# Patient Record
Sex: FEMALE | Race: WHITE | NOT HISPANIC OR LATINO | Employment: STUDENT | URBAN - METROPOLITAN AREA
[De-identification: names, ages, dates, MRNs, and addresses within clinical notes are randomized per-mention and may not be internally consistent; named-entity substitution may affect disease eponyms.]

---
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
## 2023-05-02 ENCOUNTER — OFFICE VISIT (OUTPATIENT)
Dept: OBSTETRICS AND GYNECOLOGY | Facility: CLINIC | Age: 18
End: 2023-05-02
Payer: COMMERCIAL

## 2023-05-02 VITALS
HEIGHT: 59 IN | SYSTOLIC BLOOD PRESSURE: 136 MMHG | DIASTOLIC BLOOD PRESSURE: 84 MMHG | BODY MASS INDEX: 35.36 KG/M2 | WEIGHT: 175.4 LBS

## 2023-05-02 DIAGNOSIS — Z30.45 ENCOUNTER FOR SURVEILLANCE OF TRANSDERMAL PATCH HORMONAL CONTRACEPTIVE DEVICE: Primary | ICD-10-CM

## 2023-05-02 DIAGNOSIS — Z78.9 USES BIRTH CONTROL: ICD-10-CM

## 2023-05-02 RX ORDER — EPINEPHRINE 0.3 MG/.3ML
0.3 INJECTION SUBCUTANEOUS
COMMUNITY
Start: 2022-12-29 | End: 2023-12-29

## 2023-05-02 RX ORDER — NORELGESTROMIN AND ETHINYL ESTRADIOL 150; 35 UG/D; UG/D
1 PATCH TRANSDERMAL WEEKLY
Qty: 12 PATCH | Refills: 4 | Status: SHIPPED | OUTPATIENT
Start: 2023-05-02

## 2023-05-02 RX ORDER — HYDROXYZINE HYDROCHLORIDE 10 MG/1
10-20 TABLET, FILM COATED ORAL
COMMUNITY
Start: 2023-04-19 | End: 2023-05-02

## 2023-05-02 RX ORDER — HYDROXYZINE HYDROCHLORIDE 10 MG/1
TABLET, FILM COATED ORAL
COMMUNITY
Start: 2023-04-19

## 2023-05-02 RX ORDER — CITALOPRAM 10 MG/1
1 TABLET ORAL DAILY
COMMUNITY
Start: 2023-01-23 | End: 2023-05-02

## 2023-05-02 NOTE — PROGRESS NOTES
"      Yudy Meyers is a 17 y.o. patient who presents for follow up of   Chief Complaint   Patient presents with   • Annual Exam     Birth control       HPI 17-year-old G0 here for contraception surveillance.  She uses the patch continuously.  Has had some breakthrough bleeding in the past and that continues over the last year.  Denies any of DVT or PE symptoms.  No change in medical history since last visit.    The following portions of the patient's history were reviewed and updated as appropriate: allergies, current medications and problem list.    Review of Systems   Constitutional: Negative for appetite change, fever and unexpected weight change.   HENT: Negative for congestion and sore throat.    Respiratory: Negative for cough and shortness of breath.    Cardiovascular: Negative for chest pain and palpitations.   Gastrointestinal: Negative for abdominal distention, abdominal pain, constipation, diarrhea, nausea and vomiting.   Endocrine: Negative.    Genitourinary: Negative for dyspareunia, menstrual problem, pelvic pain and vaginal discharge.   Skin: Negative.    Neurological: Negative for dizziness and syncope.   Hematological: Negative.    Psychiatric/Behavioral: Negative for dysphoric mood and sleep disturbance. The patient is not nervous/anxious.        /84   Ht 149.9 cm (59.02\")   Wt 79.6 kg (175 lb 6.4 oz)   LMP 04/28/2023 (Exact Date)   BMI 35.41 kg/m²     Physical Exam  Vitals and nursing note reviewed.   Constitutional:       Appearance: She is well-developed.   HENT:      Head: Normocephalic and atraumatic.   Pulmonary:      Effort: Pulmonary effort is normal. No respiratory distress.   Abdominal:      General: There is no distension.      Palpations: Abdomen is soft. There is no mass.      Tenderness: There is no abdominal tenderness. There is no guarding or rebound.   Musculoskeletal:         General: Normal range of motion.   Skin:     General: Skin is warm and dry.   Neurological:     "  Mental Status: She is alert and oriented to person, place, and time.   Psychiatric:         Behavior: Behavior normal.         Thought Content: Thought content normal.         Judgment: Judgment normal.           Assessment/Plan    Diagnoses and all orders for this visit:    1. Encounter for surveillance of transdermal patch hormonal contraceptive device (Primary)    2. Uses birth control  -     POC Urinalysis Dipstick  -     POC Pregnancy, Urine    Other orders  -     norelgestromin-ethinyl estradiol (Xulane) 150-35 MCG/24HR; Place 1 patch on the skin as directed by provider 1 (One) Time Per Week. No off weeks, use continuously.  Dispense: 12 patch; Refill: 4    Okay to refill birth control.  Follow-up 1 year.  Pap at 19.    Return in about 1 year (around 5/2/2024) for Annual physical.    I spent 20 minutes caring for Yudy on this date of service. This time includes time spent by me in the following activities: preparing for the visit, obtaining and/or reviewing a separately obtained history, performing a medically appropriate examination and/or evaluation, counseling and educating the patient/family/caregiver, ordering medications, tests, or procedures and documenting information in the medical record     Andi Rankin MD  5/2/2023  13:10 EDT

## 2024-07-12 ENCOUNTER — OFFICE VISIT (OUTPATIENT)
Dept: OBSTETRICS AND GYNECOLOGY | Facility: CLINIC | Age: 19
End: 2024-07-12
Payer: COMMERCIAL

## 2024-07-12 VITALS
HEIGHT: 59 IN | WEIGHT: 183 LBS | DIASTOLIC BLOOD PRESSURE: 94 MMHG | BODY MASS INDEX: 36.89 KG/M2 | SYSTOLIC BLOOD PRESSURE: 168 MMHG

## 2024-07-12 DIAGNOSIS — N91.4 SECONDARY OLIGOMENORRHEA: Primary | ICD-10-CM

## 2024-07-12 DIAGNOSIS — Z01.419 ROUTINE GYNECOLOGICAL EXAMINATION: ICD-10-CM

## 2024-07-12 LAB
B-HCG UR QL: NEGATIVE
BILIRUB BLD-MCNC: NEGATIVE MG/DL
CLARITY, POC: CLEAR
COLOR UR: YELLOW
EXPIRATION DATE: NORMAL
GLUCOSE UR STRIP-MCNC: NEGATIVE MG/DL
INTERNAL NEGATIVE CONTROL: NORMAL
INTERNAL POSITIVE CONTROL: NORMAL
KETONES UR QL: NEGATIVE
LEUKOCYTE EST, POC: NEGATIVE
Lab: NORMAL
NITRITE UR-MCNC: NEGATIVE MG/ML
PH UR: 5 [PH] (ref 5–8)
PROT UR STRIP-MCNC: NEGATIVE MG/DL
RBC # UR STRIP: NEGATIVE /UL
SP GR UR: 1 (ref 1–1.03)
UROBILINOGEN UR QL: NORMAL

## 2024-07-12 NOTE — PROGRESS NOTES
18-year-old G0 presents for follow-up.  Was on the patch until 1 year ago and thought her body needed a break.  Stopped patch.  Has been oligomenorrheic since.  Only 1 cycle in the last 11 months.  She was never regular after menarche.  Was regular on the patch.  Interested in restarting birth control but more of a long-term solution.  Discussed options of Nexplanon, IUD.  Noted hypertension but never been diagnosed previously.  Acne is present.  Increased BMI.  Suspect PCOS and insulin resistance.  Recommend follow-up for fasting PCOS labs.  Kyleena literature given.  If interested we can order it for her in place at her follow-up visit.  Patient agreeable with plan.    I spent 20 minutes caring for Yudy on this date of service. This time includes time spent by me in the following activities: preparing for the visit, reviewing tests, performing a medically appropriate examination and/or evaluation, counseling and educating the patient/family/caregiver, documenting information in the medical record, and ordering test(s).    Andi Rankin MD

## 2024-07-22 ENCOUNTER — TELEPHONE (OUTPATIENT)
Dept: OBSTETRICS AND GYNECOLOGY | Facility: CLINIC | Age: 19
End: 2024-07-22
Payer: COMMERCIAL

## 2024-07-31 ENCOUNTER — OFFICE VISIT (OUTPATIENT)
Dept: OBSTETRICS AND GYNECOLOGY | Facility: CLINIC | Age: 19
End: 2024-07-31
Payer: COMMERCIAL

## 2024-07-31 VITALS
WEIGHT: 188 LBS | BODY MASS INDEX: 37.9 KG/M2 | SYSTOLIC BLOOD PRESSURE: 160 MMHG | HEIGHT: 59 IN | DIASTOLIC BLOOD PRESSURE: 88 MMHG

## 2024-07-31 DIAGNOSIS — E28.2 PCOS (POLYCYSTIC OVARIAN SYNDROME): ICD-10-CM

## 2024-07-31 DIAGNOSIS — Z30.430 ENCOUNTER FOR IUD INSERTION: Primary | ICD-10-CM

## 2024-07-31 DIAGNOSIS — I10 HYPERTENSION, UNSPECIFIED TYPE: ICD-10-CM

## 2024-07-31 PROBLEM — E88.819 INSULIN RESISTANCE: Status: ACTIVE | Noted: 2024-07-31

## 2024-07-31 LAB
B-HCG UR QL: NEGATIVE
EXPIRATION DATE: NORMAL
INTERNAL NEGATIVE CONTROL: NEGATIVE
INTERNAL POSITIVE CONTROL: POSITIVE
Lab: 55

## 2024-07-31 NOTE — PROGRESS NOTES
18-year-old G0 here for follow-up.  Oligomenorrhea off birth control.  PCO S labs show insulin resistance.  Acne is present.  Increased BMI.  Hypertension confirmed with second reading now.  Blood pressure 160/88.  No headache or visual changes.  Discussed PCOS changes and to low-carb diet with intermittent fasting.  Referral placed for PCP for possible treatment of hypertension.    Patient desires Kyleena IUD.Procedure: Intrauterine device insertion    Pre procedure indication 1) Desires Kyleena  Post procedure indication 1) Desires Kyleena    The risks, benefits, and alternatives to an intrauterine device were explained at length with the patient. All her questions were answered and consents were signed.    The patient was placed in a dorsal lithotomy position on the examining table in [facility]. A bimanual exam confirmed the uterus was normal in size, anteverted. A warmed metal speculum was inserted into the vagina and the cervix was brought into view.    The cervix was prepped with Betadine. The anterior lip was grasped with a single-tooth tenaculum. The endometrial cavity was then sounded to 7 cm without use of a dilator. This sealed IUD package was opened and the IUD was removed in a sterile fashion.    The upper edge of the depth setting the flange was set at a uterine sound measured. The  was then carefully advanced to the cervical canal into the uterus to the level of the fundus. This was then backed off about 1.5-2 cm to allow sufficient space for the arms to open. The slider was then retracted about 1 cm and deployed the device. The device was then gently advanced to the fundus. The IUD was then released by pulling the slider down all the way. The  was removed carefully from the uterus. The threads were then cut leaving 2-3 cm visible outside of the cervix.  The single-tooth tenaculum was removed from the anterior lip. Good hemostasis was noted.     All other instruments were removed from  the vagina.   There were no complications.  The patient tolerated the procedure well with a minimal amount of discomfort.    The patient was counseled about the need to return in 4 weeks for string check.     She was counseled about the need to use a backup method of contraception such as condoms until her post insertion exam was performed. The patient verbalized understanding that the IUD will need to be removed after it expires. The patient has chosen the MMIUDSELECTION: Kyleena (5 year device).  The patient is counseled to contact us if she has any significant or increasing bleeding, pain, fever, chills, or other concerns. She is instructed to see a doctor right away if she believes that she may be pregnant at any time with the IUD in place.    HARIKA Rankin MD  7/31/2024  13:22 EDT

## 2024-08-22 ENCOUNTER — OFFICE VISIT (OUTPATIENT)
Dept: FAMILY MEDICINE CLINIC | Facility: CLINIC | Age: 19
End: 2024-08-22
Payer: COMMERCIAL

## 2024-08-22 ENCOUNTER — PATIENT ROUNDING (BHMG ONLY) (OUTPATIENT)
Dept: FAMILY MEDICINE CLINIC | Facility: CLINIC | Age: 19
End: 2024-08-22
Payer: COMMERCIAL

## 2024-08-22 VITALS
RESPIRATION RATE: 18 BRPM | HEIGHT: 59 IN | WEIGHT: 191 LBS | DIASTOLIC BLOOD PRESSURE: 86 MMHG | BODY MASS INDEX: 38.51 KG/M2 | OXYGEN SATURATION: 99 % | HEART RATE: 64 BPM | SYSTOLIC BLOOD PRESSURE: 132 MMHG

## 2024-08-22 DIAGNOSIS — F41.1 GENERALIZED ANXIETY DISORDER: Primary | ICD-10-CM

## 2024-08-22 DIAGNOSIS — E55.9 VITAMIN D DEFICIENCY: ICD-10-CM

## 2024-08-22 DIAGNOSIS — R53.83 FATIGUE, UNSPECIFIED TYPE: ICD-10-CM

## 2024-08-22 DIAGNOSIS — D64.9 ANEMIA, UNSPECIFIED TYPE: ICD-10-CM

## 2024-08-22 RX ORDER — VENLAFAXINE HYDROCHLORIDE 37.5 MG/1
37.5 TABLET, EXTENDED RELEASE ORAL
Qty: 30 EACH | Refills: 2 | Status: SHIPPED | OUTPATIENT
Start: 2024-08-22

## 2024-08-22 NOTE — PROGRESS NOTES
Adriano Fernandez,   [facility]  [address]  [place] [address]  [phone]    Subjective      Name Yudy Meyers MRN [id number]    2005 AGE/SEX 18 y.o. / female      Chief Complaint Chief Complaint   Patient presents with    Establish Care     New patient here to establish care, would like to discuss insulin resistance, high blood pressure and restarting anxiety medication          Visit History for  2024    Yudy Meyers is a 18 y.o. female who presented today for Establish Care (New patient here to establish care, would like to discuss insulin resistance, high blood pressure and restarting anxiety medication )       History of Present Illness  Presented today for evaluation of blood pressure and insulin resistance associated with her Polycystic Ovary Syndrome (PCOS). Her PCOS diagnosis was recent, following a 4-year period of birth control use. After discontinuing birth control last year, she experienced a year-long absence of menstruation, which has only recently resumed. She reports no symptoms of hypoglycemia. She also mentions feeling more fatigued than usual, despite taking naps. She is unsure if her thyroid function has been previously assessed. Her previous doctor had suggested checking her cortisol levels, but she does not recall the reason for this. She has never taken metformin.    She expresses a desire to resume anxiety medication. She has tried Celexa and Zoloft in the past, but found that Celexa induced anger and Zoloft left her feeling emotionally numb. She has also used hydroxyzine, which was effective but caused excessive sleepiness. She struggles with insomnia and experiences heightened anxiety before sleep, often fearing death if she falls asleep. She believes her current symptoms are a combination of anxiety and depression, with anxiety being the predominant issue. She has attended two therapy sessions and attempted journaling as a  "coping mechanism, but did not find it helpful. She is open to trying different therapies and medications.    She reports no seasonal allergies and has never felt the need to take allergy medication. However, she underwent allergy testing in 2022 due to persistent swelling of her upper lip, which eventually spread to one side of her face. An allergist suggested the swelling might be stress-related. The swelling now occurs infrequently and resolves on its own. She has never been tested for herpes and has never had cold sores.    SOCIAL HISTORY  She is single and has no children.    FAMILY HISTORY  Her grandmother had diabetes, but she does not know if it was type 1 or type 2. Her mother gets cold sores. Her mother had female reproductive issues and had a full hysterectomy 8 years ago. Her grandmother also had a partial hysterectomy when she was 21. She thinks her father's family has similar issues.             Medications and Allergies   Current Outpatient Medications   Medication Instructions    levonorgestrel (Kyleena) 19.5 MG intrauterine device IUD Intrauterine    venlafaxine 37.5 mg, Oral, Daily With Breakfast     No Known Allergies   I have reviewed the above medications and allergies     Objective:      Vitals Vitals:    08/22/24 0816   BP: 132/86   BP Location: Left arm   Patient Position: Sitting   Cuff Size: Adult   Pulse: 64   Resp: 18   SpO2: 99%   Weight: 86.6 kg (191 lb)   Height: 149.9 cm (59.02\")     Body mass index is 38.55 kg/m².    Physical Exam  Vitals reviewed.   Constitutional:       General: She is not in acute distress.     Appearance: She is not ill-appearing.   Cardiovascular:      Rate and Rhythm: Normal rate and regular rhythm.   Pulmonary:      Effort: Pulmonary effort is normal.      Breath sounds: Normal breath sounds.   Neurological:      Mental Status: She is alert.   Psychiatric:         Mood and Affect: Mood normal.         Behavior: Behavior normal.         Thought Content: Thought " content normal.         Judgment: Judgment normal.          Physical Exam       Results  Laboratory Studies  Fasting glucose levels were slightly elevated.   GAD7 DOCUMENTATION    Feeling nervous, anxious or on edge 3   Not being able to stop or control worrying 2   Worrying too much about different things 2   Trouble relaxing 2   Being so restless that it is hard to sit still 1   Becoming easily annoyed or irritable 0   Feeling Afraid as if something awful might happen 3   IRENE Total Score 13   If you checked any problems, how difficult have these problems made it for you to do your work, take care of things at home or get along with other people? Somewhat difficult      Assessment/Plan   Issues Addressed/ Plan   Diagnosis Plan   1. Generalized anxiety disorder  venlafaxine 37.5 MG tablet sustained-release 24 hour 24 hr tablet    TSH    T4, free      2. Fatigue, unspecified type  Comprehensive metabolic panel    CBC w AUTO Differential    Iron Profile    Vitamin D 25 hydroxy    Hemoglobin A1c    TSH    T4, free    Cortisol - AM      3. Anemia, unspecified type  CBC w AUTO Differential    Iron Profile      4. Vitamin D deficiency  Vitamin D 25 hydroxy         Assessment & Plan  1. Anxiety and depression.  Symptoms and self-reported experiences suggest a combination of anxiety and depression. Effexor has been prescribed. Potential side effects and the expected time frame for the medication to take effect have been discussed. She has been advised to report any side effects experienced. If Effexor proves unsatisfactory, a genetic test will be recommended to identify which medications are better metabolized by her body. Cognitive behavioral therapy or mindfulness and acceptance therapy have been suggested as potential therapeutic approaches. She has been encouraged to seek therapy and to request specific skills to manage anxiety during sessions.    2. Polycystic ovary syndrome.  Symptoms and medical history suggest PCOS.  Blood work has been ordered to monitor thyroid and blood sugar levels. The potential use of metformin for PCOS has been discussed.    3. Insulin resistance.  Blood work has been ordered to monitor blood sugar levels. The potential use of metformin has been discussed.    4. Facial swelling.  Recurrent facial swelling could be due to an atypical herpetic lesion, which may be stress-related. She has been advised to return for a consultation if the swelling recurs. At that time, an antiviral medication will be considered to determine if the issue is viral in nature.    Follow-up  A follow-up appointment is scheduled in 1 month to monitor the effectiveness of the new medication and to assess the progression of the PCOS.     Class 2 Severe Obesity (BMI >=35 and <=39.9). Obesity-related health conditions include the following: none. Obesity is unchanged. BMI is is above average; BMI management plan is completed. We discussed portion control and increasing exercise.     There are no Patient Instructions on file for this visit.   Follow up  recommended Return in about 1 month (around 9/22/2024) for PCOS, Anxiety.   - Dragon voice recognition software was utilized to complete this chart.  Every reasonable attempt was made to edit and correct the text, however some incorrect words may remain.   Adriano Fernandez DO    Patient or patient representative verbalized consent for the use of Ambient Listening during the visit with  Adriano Fernandez DO for chart documentation. 8/22/2024  09:16 EDT

## 2024-08-22 NOTE — PROGRESS NOTES
A [employer] message has been sent to the patient for PATIENT ROUNDING with [facility]

## 2024-08-22 NOTE — PROGRESS NOTES
Venipuncture Blood Specimen Collection  Venipuncture performed in right arm by Paris Quinn MA with good hemostasis. Patient tolerated the procedure well without complications.   08/22/24   Paris Quinn MA

## 2024-08-23 LAB
25(OH)D3+25(OH)D2 SERPL-MCNC: 17.8 NG/ML (ref 30–100)
ALBUMIN SERPL-MCNC: 4.3 G/DL (ref 3.5–5.2)
ALBUMIN/GLOB SERPL: 1.6 G/DL
ALP SERPL-CCNC: 142 U/L (ref 39–117)
ALT SERPL-CCNC: 9 U/L (ref 1–33)
AST SERPL-CCNC: 13 U/L (ref 1–32)
BASOPHILS # BLD AUTO: 0.04 10*3/MM3 (ref 0–0.2)
BASOPHILS NFR BLD AUTO: 0.5 % (ref 0–1.5)
BILIRUB SERPL-MCNC: 0.3 MG/DL (ref 0–1.2)
BUN SERPL-MCNC: 10 MG/DL (ref 6–20)
BUN/CREAT SERPL: 14.3 (ref 7–25)
CALCIUM SERPL-MCNC: 9.4 MG/DL (ref 8.6–10.5)
CHLORIDE SERPL-SCNC: 99 MMOL/L (ref 98–107)
CO2 SERPL-SCNC: 26.5 MMOL/L (ref 22–29)
CORTIS AM PEAK SERPL-MCNC: 15.3 UG/DL (ref 6.2–19.4)
CREAT SERPL-MCNC: 0.7 MG/DL (ref 0.57–1)
EGFRCR SERPLBLD CKD-EPI 2021: 127.2 ML/MIN/1.73
EOSINOPHIL # BLD AUTO: 0.11 10*3/MM3 (ref 0–0.4)
EOSINOPHIL NFR BLD AUTO: 1.3 % (ref 0.3–6.2)
ERYTHROCYTE [DISTWIDTH] IN BLOOD BY AUTOMATED COUNT: 13.4 % (ref 12.3–15.4)
GLOBULIN SER CALC-MCNC: 2.7 GM/DL
GLUCOSE SERPL-MCNC: 92 MG/DL (ref 65–99)
HBA1C MFR BLD: 4.8 % (ref 4.8–5.6)
HCT VFR BLD AUTO: 40.7 % (ref 34–46.6)
HGB BLD-MCNC: 13.3 G/DL (ref 12–15.9)
IMM GRANULOCYTES # BLD AUTO: 0.03 10*3/MM3 (ref 0–0.05)
IMM GRANULOCYTES NFR BLD AUTO: 0.3 % (ref 0–0.5)
IRON SATN MFR SERPL: 10 % (ref 20–50)
IRON SERPL-MCNC: 37 MCG/DL (ref 37–145)
LYMPHOCYTES # BLD AUTO: 1.78 10*3/MM3 (ref 0.7–3.1)
LYMPHOCYTES NFR BLD AUTO: 20.3 % (ref 19.6–45.3)
MCH RBC QN AUTO: 26 PG (ref 26.6–33)
MCHC RBC AUTO-ENTMCNC: 32.7 G/DL (ref 31.5–35.7)
MCV RBC AUTO: 79.6 FL (ref 79–97)
MONOCYTES # BLD AUTO: 0.48 10*3/MM3 (ref 0.1–0.9)
MONOCYTES NFR BLD AUTO: 5.5 % (ref 5–12)
NEUTROPHILS # BLD AUTO: 6.35 10*3/MM3 (ref 1.7–7)
NEUTROPHILS NFR BLD AUTO: 72.1 % (ref 42.7–76)
NRBC BLD AUTO-RTO: 0 /100 WBC (ref 0–0.2)
PLATELET # BLD AUTO: 377 10*3/MM3 (ref 140–450)
POTASSIUM SERPL-SCNC: 4.1 MMOL/L (ref 3.5–5.2)
PROT SERPL-MCNC: 7 G/DL (ref 6–8.5)
RBC # BLD AUTO: 5.11 10*6/MM3 (ref 3.77–5.28)
SODIUM SERPL-SCNC: 136 MMOL/L (ref 136–145)
T4 FREE SERPL-MCNC: 1.15 NG/DL (ref 0.92–1.68)
TIBC SERPL-MCNC: 359 MCG/DL
TSH SERPL DL<=0.005 MIU/L-ACNC: 2.6 UIU/ML (ref 0.27–4.2)
UIBC SERPL-MCNC: 322 MCG/DL (ref 112–346)
WBC # BLD AUTO: 8.79 10*3/MM3 (ref 3.4–10.8)

## 2024-08-28 ENCOUNTER — OFFICE VISIT (OUTPATIENT)
Dept: OBSTETRICS AND GYNECOLOGY | Facility: CLINIC | Age: 19
End: 2024-08-28
Payer: COMMERCIAL

## 2024-08-28 VITALS — SYSTOLIC BLOOD PRESSURE: 126 MMHG | DIASTOLIC BLOOD PRESSURE: 88 MMHG | HEIGHT: 59 IN | BODY MASS INDEX: 38.56 KG/M2

## 2024-08-28 DIAGNOSIS — Z30.431 IUD CHECK UP: Primary | ICD-10-CM

## 2024-08-28 LAB

## 2024-08-28 NOTE — PROGRESS NOTES
1 month status post Kyleena IUD placement.  Patient bled for 2 weeks but is stopped.  She has no perception of it being present.  Her significant other feels that during sex and she inquired about having her strings trimmed.  She has no other complaints and is happy with this form of birth control at this time.  Pelvic exam was performed.  Strings were somewhat longer than normal so they were trimmed back to about 2 cm.  Patient tolerated procedure well.  Follow-up 1 year annual physical.    I spent 10 minutes caring for Yudy on this date of service. This time includes time spent by me in the following activities: preparing for the visit, performing a medically appropriate examination and/or evaluation, counseling and educating the patient/family/caregiver, and documenting information in the medical record.    Andi Rankin MD

## 2024-09-01 RX ORDER — ERGOCALCIFEROL 1.25 MG/1
50000 CAPSULE, LIQUID FILLED ORAL WEEKLY
Qty: 5 CAPSULE | Refills: 2 | Status: SHIPPED | OUTPATIENT
Start: 2024-09-01

## 2024-09-27 DIAGNOSIS — F41.1 GENERALIZED ANXIETY DISORDER: ICD-10-CM

## 2024-09-27 RX ORDER — VENLAFAXINE HYDROCHLORIDE 37.5 MG/1
37.5 TABLET, EXTENDED RELEASE ORAL
Qty: 90 EACH | Refills: 0 | Status: SHIPPED | OUTPATIENT
Start: 2024-09-27

## 2024-11-19 ENCOUNTER — TELEMEDICINE (OUTPATIENT)
Dept: FAMILY MEDICINE CLINIC | Facility: CLINIC | Age: 19
End: 2024-11-19
Payer: COMMERCIAL

## 2024-11-19 DIAGNOSIS — F41.1 GENERALIZED ANXIETY DISORDER: ICD-10-CM

## 2024-11-19 DIAGNOSIS — E61.1 IRON DEFICIENCY: ICD-10-CM

## 2024-11-19 DIAGNOSIS — F51.01 PRIMARY INSOMNIA: Primary | ICD-10-CM

## 2024-11-19 DIAGNOSIS — E55.9 VITAMIN D DEFICIENCY: ICD-10-CM

## 2024-11-19 PROCEDURE — 99214 OFFICE O/P EST MOD 30 MIN: CPT | Performed by: STUDENT IN AN ORGANIZED HEALTH CARE EDUCATION/TRAINING PROGRAM

## 2024-11-19 RX ORDER — TRAZODONE HYDROCHLORIDE 50 MG/1
50 TABLET, FILM COATED ORAL NIGHTLY
Qty: 30 TABLET | Refills: 2 | Status: SHIPPED | OUTPATIENT
Start: 2024-11-19

## 2024-11-19 NOTE — PROGRESS NOTES
Adriano Fernandez,   [facility]  [address]  [place] [address]  [phone]    Subjective      Name Yudy Meyers MRN [id number]    2005 AGE/SEX 19 y.o. / female      Chief Complaint No chief complaint on file.        Visit History for  2024    Yudy Meyers is a 19 y.o. female who presented today for No chief complaint on file.       History of Present Illness  The patient is a 19-year-old female presenting after consenting to a video telemedicine visit.    At her previous appointment, she was prescribed Effexor 37.5 mg for anxiety and depression. She reports that the medication has been effective, although it occasionally causes forgetfulness.    She has recently relocated to [place] and started a new job. She has previously used hydroxyzine to manage her anxiety, which tends to worsen before bedtime. Hydroxyzine was effective in helping her sleep, but it resulted in prolonged sleep duration. Melatonin has not been beneficial for her. She has not used Benadryl extensively and has not tried Tylenol PM or Unisom.    She has been supplementing with iron and vitamin D.       Medications and Allergies   Current Outpatient Medications   Medication Instructions    levonorgestrel (Kyleena) 19.5 MG intrauterine device IUD Intrauterine    traZODone (DESYREL) 50 mg, Oral, Nightly    venlafaxine 37.5 mg, Oral, Daily With Breakfast    vitamin D (ERGOCALCIFEROL) 50,000 Units, Oral, Weekly     No Known Allergies   I have reviewed the above medications and allergies     Objective:      Vitals There were no vitals filed for this visit.  There is no height or weight on file to calculate BMI.    Physical Exam  Constitutional:       General: She is not in acute distress.     Appearance: She is not ill-appearing.   Pulmonary:      Effort: Pulmonary effort is normal.   Psychiatric:         Mood and Affect: Mood normal.         Behavior: Behavior normal.         Thought  Content: Thought content normal.         Judgment: Judgment normal.          Physical Exam       Results       Assessment/Plan   Issues Addressed/ Plan   Diagnosis Plan   1. Primary insomnia  traZODone (DESYREL) 50 MG tablet      2. Generalized anxiety disorder        3. Vitamin D deficiency        4. Iron deficiency           Assessment & Plan  1. Anxiety and depression.  The patient reports that Effexor 37.5 mg is working fine but causes some forgetfulness, a known side effect. A genetic test was discussed to determine if she is genetically predisposed to experiencing worse side effects. She requested something to help her sleep due to anxiety. Trazodone 50 mg was prescribed, with instructions to start at a half dose of 25 mg and increase to 50 mg if necessary. If 50 mg is ineffective, she can increase to 100 mg but should notify if she reaches this dose. Potential side effects and the cumulative nature of sleep were discussed.    2. Elevated insulin levels.  Her insulin levels are slightly elevated, but there is no indication of insulin resistance. She was advised to monitor her carbohydrate intake and practice carb counting to manage her insulin levels. She was also advised to keep a food journal to identify triggers for hypoglycemic episodes.    3. Iron deficiency.  Her iron levels are low, and her red blood cells are small, indicating a potential iron deficiency. She was advised to continue taking iron supplements.    4. Vitamin D deficiency.  Her vitamin D levels are low. She was advised to continue taking vitamin D supplements through the winter, potentially up to February, and to ensure adequate sun exposure during the summer.    Follow-up  Return in 6 months for follow up.           There are no Patient Instructions on file for this visit.   Follow up  recommended Return in about 6 months (around 5/19/2025).   - Dragon voice recognition software was utilized to complete this chart.  Every reasonable  attempt was made to edit and correct the text, however some incorrect words may remain.   Adriano Fernandez DO    Patient or patient representative verbalized consent for the use of Ambient Listening during the visit with  Adriano Fernandez DO for chart documentation. 11/19/2024  17:13 EST

## 2024-11-26 DIAGNOSIS — E55.9 VITAMIN D DEFICIENCY: ICD-10-CM

## 2024-11-26 RX ORDER — ERGOCALCIFEROL 1.25 MG/1
50000 CAPSULE, LIQUID FILLED ORAL WEEKLY
Qty: 5 CAPSULE | Refills: 2 | Status: SHIPPED | OUTPATIENT
Start: 2024-11-26

## 2024-12-09 ENCOUNTER — TELEPHONE (OUTPATIENT)
Dept: FAMILY MEDICINE CLINIC | Facility: CLINIC | Age: 19
End: 2024-12-09
Payer: COMMERCIAL

## 2024-12-09 DIAGNOSIS — F51.01 PRIMARY INSOMNIA: ICD-10-CM

## 2024-12-09 DIAGNOSIS — F41.1 GENERALIZED ANXIETY DISORDER: Primary | ICD-10-CM

## 2024-12-09 NOTE — TELEPHONE ENCOUNTER
Yudy [facility] (supporting Adriano Fernandez DO)2 days ago       Hi! So I've been taking the trazodone and I think it's making my anxiety worse. I've been feeling pretty bad overall and I've had 3 panic attacks in the last week. The medicine is the only new thing that's been added or changed lately. I'm going to stop taking it.

## 2024-12-13 RX ORDER — QUETIAPINE FUMARATE 25 MG/1
25 TABLET, FILM COATED ORAL NIGHTLY
Qty: 30 TABLET | Refills: 2 | Status: SHIPPED | OUTPATIENT
Start: 2024-12-13

## 2024-12-13 NOTE — TELEPHONE ENCOUNTER
Does she think that the venlafaxine is still working for her?  Another option for sleep would be Seroquil.  At low doses it is good for anxiety and sleep.  Should only need 25 mg.  She can stop the trazodone.  I will send the new medication if she would like to start

## 2024-12-18 DIAGNOSIS — F41.1 GENERALIZED ANXIETY DISORDER: ICD-10-CM

## 2024-12-18 DIAGNOSIS — F51.01 PRIMARY INSOMNIA: ICD-10-CM

## 2024-12-18 RX ORDER — QUETIAPINE FUMARATE 25 MG/1
25 TABLET, FILM COATED ORAL NIGHTLY
Qty: 90 TABLET | Refills: 0 | Status: SHIPPED | OUTPATIENT
Start: 2024-12-18

## 2025-01-17 ENCOUNTER — TELEMEDICINE (OUTPATIENT)
Dept: FAMILY MEDICINE CLINIC | Facility: CLINIC | Age: 20
End: 2025-01-17
Payer: COMMERCIAL

## 2025-01-17 DIAGNOSIS — F41.1 GENERALIZED ANXIETY DISORDER: ICD-10-CM

## 2025-01-17 DIAGNOSIS — G56.03 CARPAL TUNNEL SYNDROME, BILATERAL: Primary | ICD-10-CM

## 2025-01-17 DIAGNOSIS — L21.9 SEBORRHEIC DERMATITIS: ICD-10-CM

## 2025-01-17 DIAGNOSIS — M25.531 BILATERAL WRIST PAIN: ICD-10-CM

## 2025-01-17 DIAGNOSIS — M25.532 BILATERAL WRIST PAIN: ICD-10-CM

## 2025-01-17 PROCEDURE — 99214 OFFICE O/P EST MOD 30 MIN: CPT | Performed by: STUDENT IN AN ORGANIZED HEALTH CARE EDUCATION/TRAINING PROGRAM

## 2025-01-17 RX ORDER — KETOCONAZOLE 20 MG/ML
SHAMPOO, SUSPENSION TOPICAL 2 TIMES WEEKLY
Qty: 120 ML | Refills: 1 | Status: SHIPPED | OUTPATIENT
Start: 2025-01-20 | End: 2025-01-17

## 2025-01-17 RX ORDER — VENLAFAXINE HYDROCHLORIDE 75 MG/1
75 TABLET, EXTENDED RELEASE ORAL
Qty: 30 TABLET | Refills: 2 | Status: SHIPPED | OUTPATIENT
Start: 2025-01-17

## 2025-01-17 NOTE — PROGRESS NOTES
Adriano Fernandze,   [facility]  [address]  [place] [address]  [phone]    Subjective      Name Yudy Meyers MRN [id number]    2005 AGE/SEX 19 y.o. / female      Chief Complaint No chief complaint on file.        Visit History for  2025    Yudy Meyers is a 19 y.o. female who presented today for No chief complaint on file.       History of Present Illness  The patient presents via virtual visit for evaluation of bilateral wrist pain, sleep issues    She has been experiencing chronic bilateral wrist pain, which she attributes to a history of sprains in both wrists. The severity of the pain fluctuates, with some days being particularly debilitating. She reports numbness and tingling in her fingers, predominantly in the first three digits, but does not experience any exacerbation of these symptoms during wrist extension. Despite the pain, she is able to perform daily activities, although with limitations such as an inability to lift heavy objects. She also experiences sharp pain when attempting to twist or lift heavy objects. She has previously consulted a hand specialist who conducted an x-ray, but no further intervention was provided. She has not undergone a nerve conduction test. Additionally, she reports that her hands tend to fall asleep during the night. She has sought relief through various means including compression gloves, ibuprofen, and physical therapy, but none have provided significant improvement. She has a history of bilateral wrist sprains, with each wrist having been sprained twice. Her last x-ray was conducted 1 to 2 years ago, prior to her physical therapy sessions.    She has expressed interest in obtaining a diagnosis for autism. She has recently initiated therapy and has had a few sessions with her therapist, who suggested the possibility of ADHD. However, she believes that autism is a more likely diagnosis. She has completed the  RADS-R test and scored highly.    She has discontinued the initial medication prescribed for her sleep issues and has started a new one, which she reports is working well. However, she notes that the morning dose of venlafaxine does not seem to be as effective as it was previously. She is currently taking one pill at night and reports that her sleep is generally good, although there are some nights when she struggles even after taking the medication. She has been prescribed Seroquel and has stopped taking trazodone.         Medications and Allergies   Current Outpatient Medications   Medication Instructions    levonorgestrel (Kyleena) 19.5 MG intrauterine device IUD Intrauterine    QUEtiapine (SEROQUEL) 25 mg, Oral, Nightly    venlafaxine 75 mg, Oral, Daily With Breakfast    vitamin D (ERGOCALCIFEROL) 50,000 Units, Oral, Weekly     No Known Allergies   I have reviewed the above medications and allergies     Objective:      Vitals There were no vitals filed for this visit.  There is no height or weight on file to calculate BMI.    Physical Exam  Vitals reviewed.   Constitutional:       General: She is not in acute distress.     Appearance: She is not ill-appearing.   Pulmonary:      Effort: Pulmonary effort is normal.   Psychiatric:         Mood and Affect: Mood normal.         Behavior: Behavior normal.         Thought Content: Thought content normal.         Judgment: Judgment normal.          Physical Exam       Results  Imaging  X-ray of wrists showed no osseous articular or soft tissue abnormality.     Assessment/Plan   Issues Addressed/ Plan   Diagnosis Plan   1. Carpal tunnel syndrome, bilateral  EMG & Nerve Conduction Test      2. Generalized anxiety disorder  venlafaxine 75 MG tablet sustained-release 24 hour 24 hr tablet      3. Bilateral wrist pain  EMG & Nerve Conduction Test      4. Seborrheic dermatitis           Assessment & Plan  1. Bilateral wrist pain.  The etiology of the pain could potentially be  attributed to carpal tunnel syndrome or a connective tissue disorder. Previous x-rays have shown no osseous or soft tissue abnormalities. An EMG test will be ordered to rule out nerve involvement. She is advised to refrain from using braces until further notice. If the EMG results are normal, the focus will shift to musculoskeletal issues, and a plan involving specific exercises will be developed. If necessary, an MRI of the wrist may be considered, and a referral to a hand specialist will be made.    2. Suspected autism.  She has been informed about the availability of neuropsychologists and Replaced by [facility] for comprehensive psychological testing, which can provide a definitive diagnosis. The cost and duration of the testing were discussed.    3. Sleep issues.  The dosage of venlafaxine will be increased to 75 mg. She is advised to continue taking Seroquel at a dosage of 50 mg, with the option to increase to 1.5 tablets if necessary.           There are no Patient Instructions on file for this visit.   Follow up  recommended Return in about 3 months (around 4/17/2025).   - Dragon voice recognition software was utilized to complete this chart.  Every reasonable attempt was made to edit and correct the text, however some incorrect words may remain.   Adriano Fernandez DO    Patient or patient representative verbalized consent for the use of Ambient Listening during the visit with  Adriano Fernandez DO for chart documentation. 1/31/2025  22:32 EST

## 2025-02-25 DIAGNOSIS — E55.9 VITAMIN D DEFICIENCY: ICD-10-CM

## 2025-02-25 RX ORDER — ERGOCALCIFEROL 1.25 MG/1
50000 CAPSULE, LIQUID FILLED ORAL WEEKLY
Qty: 5 CAPSULE | Refills: 2 | Status: SHIPPED | OUTPATIENT
Start: 2025-02-25

## 2025-03-07 ENCOUNTER — HOSPITAL ENCOUNTER (OUTPATIENT)
Dept: NEUROLOGY | Facility: HOSPITAL | Age: 20
Discharge: HOME | End: 2025-03-07
Payer: COMMERCIAL

## 2025-03-07 DIAGNOSIS — M25.531 BILATERAL WRIST PAIN: ICD-10-CM

## 2025-03-07 DIAGNOSIS — G56.03 CARPAL TUNNEL SYNDROME, BILATERAL: ICD-10-CM

## 2025-03-07 DIAGNOSIS — M25.532 BILATERAL WRIST PAIN: ICD-10-CM

## 2025-03-07 PROCEDURE — 95886 MUSC TEST DONE W/N TEST COMP: CPT | Performed by: PSYCHIATRY & NEUROLOGY

## 2025-03-07 PROCEDURE — 95911 NRV CNDJ TEST 9-10 STUDIES: CPT | Performed by: PSYCHIATRY & NEUROLOGY

## 2025-03-07 NOTE — PROCEDURES
EMG and Nerve Conduction Studies    I.      Instrument used: Neuromax 1002  II.     Please see data sheets for tabular summary of NCS and details on methods, temperatures and lab standards.   III.    EMG muscles tested for upper extremity studies include the deltoid, biceps, triceps, pronator teres, extensor digitorum communis, first dorsal interosseous and abductor pollicis brevis.    IV.   EMG muscles tested for lower extremity studies include the vastus lateralis, tibialis anterior, peroneus longus, medial gastrocnemius and extensor digitorum brevis.    V.    Additional muscles tested as needed.  Paraspinal muscles tested as needed.   VI.   Please see data sheets for tabular summary of EMG findings.   VII. The complete report includes the data sheets.      Indication: Bilateral wrist pain and numbness in fingers  History: 19-year-old female with history of wrist sprains over the years due to several falls who describes persistent pain in the wrists and some numbness and tingling in fingers that she wakes up with.  Sometimes the pain radiates up to the elbows but this is rare.  She does not have specific pain in the elbows or shoulders.  She denies a history of diabetes or thyroid disease.      Ht: 59 inches  Wt: 180 pounds  HbA1C:   Lab Results   Component Value Date    HGBA1C 4.80 08/22/2024     TSH:   Lab Results   Component Value Date    TSH 2.600 08/22/2024       Technical summary:  Nerve conduction studies were obtained in both arms.  Skin temperatures were initially cold so the hands required warming prior to study.  Temperature correction was not needed.  Needle examination was obtained on selected muscles in both arms.    Results:  1.  Normal median orthodromic palmar sensory distal latencies and amplitudes bilaterally.  There was no relative prolongation of the median palmar versus ulnar palmar latencies.  2.  Normal ulnar orthodromic palmar sensory distal latencies and amplitudes bilaterally.  3.   Normal right radial sensory distal latency and amplitude.  4.  Normal median motor conduction velocities, distal latencies and amplitudes bilaterally.  5.  Normal ulnar motor conduction velocities with normal distal latencies and amplitudes bilaterally.  6.  Needle examination of selected muscles in both arms showed normal insertional activities throughout with normal motor units and recruitment patterns throughout.    Impression:  Normal study.  No evidence of a median or ulnar neuropathy or cervical radiculopathy on either side by this study.  Study results were discussed with the patient.    Bhupinder Meredith M.D.              Dictated utilizing Dragon dictation.

## 2025-03-09 DIAGNOSIS — F41.1 GENERALIZED ANXIETY DISORDER: ICD-10-CM

## 2025-03-09 DIAGNOSIS — F51.01 PRIMARY INSOMNIA: ICD-10-CM

## 2025-03-10 RX ORDER — QUETIAPINE FUMARATE 25 MG/1
25 TABLET, FILM COATED ORAL NIGHTLY
Qty: 90 TABLET | Refills: 0 | Status: SHIPPED | OUTPATIENT
Start: 2025-03-10

## 2025-03-30 DIAGNOSIS — F51.01 PRIMARY INSOMNIA: ICD-10-CM

## 2025-03-30 DIAGNOSIS — F41.1 GENERALIZED ANXIETY DISORDER: ICD-10-CM

## 2025-03-31 RX ORDER — QUETIAPINE FUMARATE 25 MG/1
25 TABLET, FILM COATED ORAL NIGHTLY
Qty: 90 TABLET | Refills: 0 | Status: SHIPPED | OUTPATIENT
Start: 2025-03-31

## 2025-04-16 DIAGNOSIS — F41.1 GENERALIZED ANXIETY DISORDER: ICD-10-CM

## 2025-04-16 RX ORDER — VENLAFAXINE HYDROCHLORIDE 75 MG/1
75 TABLET, EXTENDED RELEASE ORAL
Qty: 30 TABLET | Refills: 3 | Status: SHIPPED | OUTPATIENT
Start: 2025-04-16

## 2025-04-22 ENCOUNTER — OFFICE VISIT (OUTPATIENT)
Dept: FAMILY MEDICINE CLINIC | Facility: CLINIC | Age: 20
End: 2025-04-22
Payer: COMMERCIAL

## 2025-04-22 VITALS
HEART RATE: 81 BPM | WEIGHT: 211 LBS | HEIGHT: 59 IN | OXYGEN SATURATION: 96 % | DIASTOLIC BLOOD PRESSURE: 68 MMHG | BODY MASS INDEX: 42.54 KG/M2 | SYSTOLIC BLOOD PRESSURE: 102 MMHG

## 2025-04-22 DIAGNOSIS — L70.0 ACNE VULGARIS: ICD-10-CM

## 2025-04-22 DIAGNOSIS — E28.2 PCOS (POLYCYSTIC OVARIAN SYNDROME): ICD-10-CM

## 2025-04-22 DIAGNOSIS — L68.0 HIRSUTISM: Primary | ICD-10-CM

## 2025-04-22 DIAGNOSIS — M25.532 BILATERAL WRIST PAIN: ICD-10-CM

## 2025-04-22 DIAGNOSIS — M25.531 BILATERAL WRIST PAIN: ICD-10-CM

## 2025-04-22 PROCEDURE — 99214 OFFICE O/P EST MOD 30 MIN: CPT | Performed by: STUDENT IN AN ORGANIZED HEALTH CARE EDUCATION/TRAINING PROGRAM

## 2025-04-22 RX ORDER — SPIRONOLACTONE 25 MG/1
25 TABLET ORAL 2 TIMES DAILY
Qty: 60 TABLET | Refills: 2 | Status: SHIPPED | OUTPATIENT
Start: 2025-04-22

## 2025-05-11 NOTE — PROGRESS NOTES
Adriano Fernandez DO  [facility]  [address]  [place] [id number] [phone]    Subjective      Name Yudy Meyers MRN [id number]    2005 AGE/SEX 19 y.o. / female      Chief Complaint Chief Complaint   Patient presents with    Results     Follow up[ on results of nerve testing done on 3/7/25 and discuss other option for issues with wrist          Visit History for  2025    Yudy Meyers is a 19 y.o. female who presented today for Results (Follow up[ on results of nerve testing done on 3/7/25 and discuss other option for issues with wrist )       History of Present Illness  The patient presents for evaluation of bilateral wrist pain, acne, PCOS, and hypoglycemia.    Bilateral wrist pain has been experienced, attributed to multiple sprains. Pain frequency varies, occurring daily or every other day, influenced by weather conditions and activities. Lifting heavy objects, such as grocery bags, exacerbates the pain, while typing on a computer does not. Despite attempts to alleviate the pain through rest, over-the-counter analgesics, topical treatments like IcyHot, and compression gloves, no effective relief has been found. Frequent hand swelling is reported, necessitating the removal of her ring. A specialist previously conducted an x-ray that revealed no fractures. Pain is described as severe, particularly when lifting objects, with occasional sensations of something in the wrist about to snap. No correlation between hand swelling and wrist pain is noted. Increased pain in other joints, particularly the ankles and knees, has been noticed over the past few years. Ankle pain has become more pronounced in recent months, accompanied by frequent popping sounds. No history of ankle injuries is reported.    A diagnosis of polycystic ovary syndrome (PCOS) has been made, and an intrauterine device (IUD) has been used since 2024. Prior to this, a year-long break from  "birth control followed continuous use of the Xulane patch for 3 to 4 years. No significant changes in PCOS symptoms have been noted since starting the Kyleena IUD, although a slight improvement in acne is reported. Hot flashes are also experienced.    Painful acne, believed to be hormonal in nature, is reported. No effective topical treatments have been found, and current use includes Cetaphil face wash and Ponds unscented moisturizer. Salicylic acid or benzoyl peroxide face washes have not been tried.    Episodes of hypoglycemia, characterized by sudden drops in blood sugar levels, are experienced despite no changes to diet or lifestyle. Dietary modifications include increased protein intake and reduced consumption of soda and sugar. Sugar pills are carried at all times.    Venlafaxine is currently taken and believed to be effective. No recent depressive episodes are reported, and a stable mood is felt. Therapy sessions were previously attended but discontinued due to financial constraints.    FAMILY HISTORY  Her grandmother and mother had osteoarthritis. Her father mentioned that leukemia runs in the family, but did not specify who had it. Her grandmother had diabetes. There is a family history of Dupuytren's contracture.       Medications and Allergies   Current Outpatient Medications   Medication Instructions    levonorgestrel (Kyleena) 19.5 MG intrauterine device IUD Intrauterine    QUEtiapine (SEROQUEL) 25 mg, Oral, Nightly    spironolactone (ALDACTONE) 25 mg, Oral, 2 Times Daily    venlafaxine 75 mg, Oral, Daily With Breakfast    vitamin D (ERGOCALCIFEROL) 50,000 Units, Oral, Weekly     No Known Allergies   I have reviewed the above medications and allergies     Objective:      Vitals Vitals:    04/22/25 1554   BP: 102/68   BP Location: Left arm   Patient Position: Sitting   Cuff Size: Adult   Pulse: 81   SpO2: 96%   Weight: 95.7 kg (211 lb)   Height: 149.9 cm (59.02\")     Body mass index is 42.59 kg/m².  "   Physical Exam     Physical Exam  Neurological: Awake, alert, oriented x4, no focal deficit  Respiratory: Clear to auscultation, no wheezing, rales or rhonchi  Cardiovascular: Regular rate and rhythm, no murmurs, rubs, or gallops     Results  Labs   - A1c: Normal    Imaging   - X-ray of wrists: No fractures or arthritis    Diagnostic Testing   - Nerve testing: No median or ulnar nerve neuropathy or any issues with the neck     Assessment/Plan   Issues Addressed/ Plan   Diagnosis Plan   1. Hirsutism  spironolactone (Aldactone) 25 MG tablet      2. Bilateral wrist pain        3. Acne vulgaris        4. PCOS (polycystic ovarian syndrome)           Assessment & Plan  1. Bilateral wrist pain.  - The nerve conduction study results were normal, showing no median or ulnar nerve neuropathy or cervical spine issues.  - The patient has a history of frequent wrist sprains, which could lead to arthritis. Previous x-rays from two years ago showed no fractures or arthritis.  - The possibility of an autoimmune disorder was considered but deemed unlikely due to the absence of radiographic evidence. The bilateral nature of the wrist pain suggests osteoarthritis.  - The patient was advised to monitor for any correlation between finger swelling and wrist pain, as this could indicate a fluid retention issue. A repeat x-ray of the wrists will be ordered.    2. Acne.  - The patient was advised to use a salicylic acid face wash daily, followed by a light moisturizer.  - If cystic acne develops, benzoyl peroxide face wash should be used every other day.  - Spironolactone 25 mg twice daily was prescribed, with instructions to reduce the dosage to once daily if hypotension occurs.  - The patient was informed that initial use of salicylic acid may cause purging and an increase in acne for the first few weeks.    3. Polycystic Ovary Syndrome (PCOS).  - The patient was informed that joint pain can be hormonal and related to PCOS.  - The use of  spironolactone may help manage hormonal acne associated with PCOS.  - The patient was advised to monitor blood pressure while on spironolactone.  - The patient reported no significant change in PCOS symptoms since starting Kyleena IUD in 08/2024.    4. Hypoglycemia.  - The patient's A1c levels were within the normal range, so true hypoglycemia cannot be confirmed at this time.  - The patient was advised to carry sugar pills or juice for immediate sugar intake during hypoglycemic episodes.  - Blood work will be ordered during the next visit to check insulin levels and rule out any other potential causes of hypoglycemia.  - The patient has made dietary changes to include more protein and whole grains, and has reduced caffeine and sugar intake.    Follow-up  The patient will follow up in 2 months.           There are no Patient Instructions on file for this visit.   Follow up  recommended Return in about 2 months (around 6/22/2025).   - Dragon voice recognition software was utilized to complete this chart.  Every reasonable attempt was made to edit and correct the text, however some incorrect words may remain.   Adriano Fernandez DO    Patient or patient representative verbalized consent for the use of Ambient Listening during the visit with  Adriano Fernandez DO for chart documentation. 5/11/2025  12:19 EDT

## 2025-06-29 DIAGNOSIS — F41.1 GENERALIZED ANXIETY DISORDER: ICD-10-CM

## 2025-06-29 DIAGNOSIS — F51.01 PRIMARY INSOMNIA: ICD-10-CM

## 2025-06-30 RX ORDER — QUETIAPINE FUMARATE 25 MG/1
25 TABLET, FILM COATED ORAL NIGHTLY
Qty: 90 TABLET | Refills: 0 | Status: SHIPPED | OUTPATIENT
Start: 2025-06-30

## 2025-07-13 DIAGNOSIS — F41.1 GENERALIZED ANXIETY DISORDER: ICD-10-CM

## 2025-07-13 DIAGNOSIS — F51.01 PRIMARY INSOMNIA: ICD-10-CM

## 2025-07-14 RX ORDER — QUETIAPINE FUMARATE 25 MG/1
25 TABLET, FILM COATED ORAL NIGHTLY
Qty: 90 TABLET | Refills: 0 | Status: SHIPPED | OUTPATIENT
Start: 2025-07-14 | End: 2025-07-15 | Stop reason: SDUPTHER

## 2025-07-15 ENCOUNTER — OFFICE VISIT (OUTPATIENT)
Dept: FAMILY MEDICINE CLINIC | Facility: CLINIC | Age: 20
End: 2025-07-15
Payer: COMMERCIAL

## 2025-07-15 VITALS
OXYGEN SATURATION: 99 % | BODY MASS INDEX: 43.75 KG/M2 | SYSTOLIC BLOOD PRESSURE: 108 MMHG | HEIGHT: 59 IN | DIASTOLIC BLOOD PRESSURE: 74 MMHG | HEART RATE: 78 BPM | RESPIRATION RATE: 16 BRPM | WEIGHT: 217 LBS

## 2025-07-15 DIAGNOSIS — F51.01 PRIMARY INSOMNIA: ICD-10-CM

## 2025-07-15 DIAGNOSIS — R53.83 FATIGUE, UNSPECIFIED TYPE: ICD-10-CM

## 2025-07-15 DIAGNOSIS — E16.8 ELEVATED FASTING INSULIN LEVEL WITH NORMAL GLUCOSE: ICD-10-CM

## 2025-07-15 DIAGNOSIS — E78.5 HYPERLIPIDEMIA, UNSPECIFIED HYPERLIPIDEMIA TYPE: ICD-10-CM

## 2025-07-15 DIAGNOSIS — E61.1 IRON DEFICIENCY: ICD-10-CM

## 2025-07-15 DIAGNOSIS — M25.60 MORNING STIFFNESS OF JOINTS: ICD-10-CM

## 2025-07-15 DIAGNOSIS — H04.129 DRY EYE: ICD-10-CM

## 2025-07-15 DIAGNOSIS — E66.813 CLASS 3 SEVERE OBESITY DUE TO EXCESS CALORIES WITHOUT SERIOUS COMORBIDITY WITH BODY MASS INDEX (BMI) OF 40.0 TO 44.9 IN ADULT: ICD-10-CM

## 2025-07-15 DIAGNOSIS — E04.2 MULTINODULAR THYROID: ICD-10-CM

## 2025-07-15 DIAGNOSIS — E55.9 VITAMIN D DEFICIENCY: ICD-10-CM

## 2025-07-15 DIAGNOSIS — M25.50 MULTIPLE JOINT PAIN: ICD-10-CM

## 2025-07-15 DIAGNOSIS — F41.1 GENERALIZED ANXIETY DISORDER: Primary | ICD-10-CM

## 2025-07-15 RX ORDER — QUETIAPINE FUMARATE 50 MG/1
50 TABLET, FILM COATED ORAL NIGHTLY
Qty: 30 TABLET | Refills: 2 | Status: SHIPPED | OUTPATIENT
Start: 2025-07-15

## 2025-07-15 NOTE — PROGRESS NOTES
Venipuncture Blood Specimen Collection  Venipuncture performed in right arm by Paris Quinn MA with good hemostasis. Patient tolerated the procedure well without complications.   07/15/25   Paris Quinn MA

## 2025-07-15 NOTE — PROGRESS NOTES
Adriano Fernandez,   [facility]  [facility]  [place] [id number] [phone]    Subjective      Name Yudy Meyers MRN [id number]    2005 AGE/SEX 19 y.o. / female      Chief Complaint Chief Complaint   Patient presents with    Med Management     Check up and labs          Visit History for  07/15/2025    Yudy Meyers is a 19 y.o. female who presented today for Med Management (Check up and labs )       History of Present Illness  The patient presents for evaluation of sleep issues, joint pain, PCOS, and vitamin D deficiency.    She reports that her sleep medication is not consistently effective. On some nights, it works well, particularly when she has not taken it for a few days. However, on other nights, it seems to have no effect. She takes the medication daily to prevent frequent awakenings at night. Initially, the medication was highly effective.    She has discontinued her vitamin D supplement as per the previous advice to stop during the summer.    She has been diagnosed with insulin resistance by Dr. Lopez. She experiences dry mouth and severe dry eyes.    She has not yet undergone the recommended wrist x-rays due to time constraints. Her wrist condition remains unchanged. Recently, she has begun to experience significant pain in her ankles and knees. Upon waking, her ankles are sore, but she can alleviate this temporarily by popping them. Prolonged walking exacerbates the pain. Her pain levels vary depending on her activities throughout the day. 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 She also experiences a pinching sensation in her lower back when standing for extended periods, such as while washing dishes. She reports occasional radiating leg pain, numbness, and tingling. She has been attempting to incorporate more stretching into her routine.    She is  "currently using birth control without any complications.       Medications and Allergies   Current Outpatient Medications   Medication Instructions    levonorgestrel (Kyleena) 19.5 MG intrauterine device IUD Intrauterine    QUEtiapine (SEROQUEL) 50 mg, Oral, Nightly    spironolactone (ALDACTONE) 25 mg, Oral, 2 Times Daily    venlafaxine 75 mg, Oral, Daily With Breakfast    vitamin D (ERGOCALCIFEROL) 50,000 Units, Oral, Weekly     No Known Allergies   I have reviewed the above medications and allergies     Objective:      Vitals Vitals:    07/15/25 0843   BP: 108/74   BP Location: Left arm   Patient Position: Sitting   Cuff Size: Adult   Pulse: 78   Resp: 16   SpO2: 99%   Weight: 98.4 kg (217 lb)   Height: 149.9 cm (59.02\")     Body mass index is 43.8 kg/m².    Physical Exam  Vitals reviewed.   Constitutional:       General: She is not in acute distress.     Appearance: She is not ill-appearing.   Pulmonary:      Effort: Pulmonary effort is normal.   Psychiatric:         Mood and Affect: Mood normal.         Behavior: Behavior normal.         Thought Content: Thought content normal.         Judgment: Judgment normal.          Physical Exam       Results  Labs   - Insulin Levels: Increased     Assessment/Plan   Issues Addressed/ Plan   Diagnosis Plan   1. Generalized anxiety disorder  QUEtiapine (SEROquel) 50 MG tablet      2. Primary insomnia  QUEtiapine (SEROquel) 50 MG tablet      3. Dry eye  Lyme Disease Total Antibody With Reflex to Immunoassay    Cyclic Citrul Peptide Antibody, IgG / IgA    CK    C-reactive Protein    Anti-Judy 1 Antibody, IgG    Sjogren's Antibody, Anti-SS-A / -SS-B    Anti-Smith Antibody    Anti-DNA Antibody, Double-stranded    MONTEZ by IFA, Reflex to Titer and Pattern      4. Multinodular thyroid        5. Multiple joint pain  Lyme Disease Total Antibody With Reflex to Immunoassay    Cyclic Citrul Peptide Antibody, IgG / IgA    CK    C-reactive Protein    Sjogren's Antibody, Anti-SS-A / -SS-B    " Thyroid Antibodies    T4, Free    T3, Free    TSH    Anti-Smith Antibody    Anti-DNA Antibody, Double-stranded    MONTEZ by IFA, Reflex to Titer and Pattern    T3, Reverse      6. Morning stiffness of joints  Lyme Disease Total Antibody With Reflex to Immunoassay    Cyclic Citrul Peptide Antibody, IgG / IgA    CK    C-reactive Protein    Sjogren's Antibody, Anti-SS-A / -SS-B    Thyroid Antibodies    T4, Free    T3, Free    TSH    Anti-Smith Antibody    Anti-DNA Antibody, Double-stranded    MONTEZ by IFA, Reflex to Titer and Pattern    T3, Reverse      7. Iron deficiency  Iron Profile w/o Ferritin      8. Vitamin D deficiency  Vitamin D 25 hydroxy      9. Elevated fasting insulin level with normal glucose  Hemoglobin A1c    Comprehensive Metabolic Panel    T4, Free    T3, Free    TSH    Insulin Antibody      10. Fatigue, unspecified type  Folate    Vitamin B12    CBC w AUTO Differential      11. Hyperlipidemia, unspecified hyperlipidemia type  Lipid Panel      12. Class 3 severe obesity due to excess calories without serious comorbidity with body mass index (BMI) of 40.0 to 44.9 in adult  CBC w AUTO Differential         Assessment & Plan  1. Sleep issues.  - Reports inconsistent effectiveness of her current sleep medication.  - Dosage will be increased to 50 mg.  - Advised that she can take a night off from the medication if needed to avoid dependency.  - Initially, the medication was effective every night.    2. Vitamin D deficiency.  - Stopped taking vitamin D as previously instructed for the summer.  - A recheck of her vitamin D levels will be conducted today.  - Labs have always been good.  - Blood work will be done today.    3. Joint pain.  - Experiences pain in her ankles and knees, particularly in the morning and after physical activity.  - Pain in her lower back described as a pinching sensation during prolonged activities.  - Comprehensive set of labs will be ordered to investigate potential causes, including  thyroid function, autoimmune antibodies, B12, folate, blood sugar, liver function, and kidney function.  - Previous tests for autoantibodies were negative; rechecking with more specific tests.    4. Polycystic ovary syndrome (PCOS).  - Increased insulin levels but does not exhibit insulin resistance.  - Test for type 2 diabetes will be conducted.  - Birth control is well-tolerated with no issues reported.  - Checking for other deficiencies related to PCOS symptoms.    5. Health maintenance.  - Cortisol levels previously checked and found to be within normal range.  - Due for a physical examination.  - Annual physical examination will be scheduled for 10/2025.  - Follow-up in 3 months for the physical examination.           There are no Patient Instructions on file for this visit.   Follow up  recommended Return in about 3 months (around 10/15/2025) for Annual physical.   - Dragon voice recognition software was utilized to complete this chart.  Every reasonable attempt was made to edit and correct the text, however some incorrect words may remain.   Adriano Fernandez DO    Patient or patient representative verbalized consent for the use of Ambient Listening during the visit with  Adriano Fernandez DO for chart documentation. 7/30/2025  15:52 EDT

## 2025-07-16 LAB — B BURGDOR IGG+IGM SER QL IA: NEGATIVE

## 2025-07-19 DIAGNOSIS — L68.0 HIRSUTISM: ICD-10-CM

## 2025-07-21 RX ORDER — SPIRONOLACTONE 25 MG/1
25 TABLET ORAL 2 TIMES DAILY
Qty: 60 TABLET | Refills: 3 | Status: SHIPPED | OUTPATIENT
Start: 2025-07-21

## 2025-07-23 LAB
25(OH)D3+25(OH)D2 SERPL-MCNC: 26.7 NG/ML (ref 30–100)
ALBUMIN SERPL-MCNC: 4 G/DL (ref 4–5)
ALP SERPL-CCNC: 129 IU/L (ref 44–121)
ALT SERPL-CCNC: 24 IU/L (ref 0–32)
ANA SER QL IF: NEGATIVE
AST SERPL-CCNC: 24 IU/L (ref 0–40)
BASOPHILS # BLD AUTO: 0 X10E3/UL (ref 0–0.2)
BASOPHILS NFR BLD AUTO: 0 %
BILIRUB SERPL-MCNC: 0.2 MG/DL (ref 0–1.2)
BUN SERPL-MCNC: 9 MG/DL (ref 6–20)
BUN/CREAT SERPL: 12 (ref 9–23)
CALCIUM SERPL-MCNC: 9.5 MG/DL (ref 8.7–10.2)
CCP IGA+IGG SERPL IA-ACNC: 5 UNITS (ref 0–19)
CHLORIDE SERPL-SCNC: 102 MMOL/L (ref 96–106)
CHOLEST SERPL-MCNC: 155 MG/DL (ref 100–199)
CK SERPL-CCNC: 46 U/L (ref 32–182)
CO2 SERPL-SCNC: 20 MMOL/L (ref 20–29)
CREAT SERPL-MCNC: 0.77 MG/DL (ref 0.57–1)
CRP SERPL-MCNC: 19 MG/L (ref 0–10)
DSDNA AB SER-ACNC: <1 IU/ML (ref 0–9)
EGFRCR SERPLBLD CKD-EPI 2021: 112 ML/MIN/1.73
ENA JO1 AB SER-ACNC: <0.2 AI (ref 0–0.9)
ENA SM AB SER-ACNC: <0.2 AI (ref 0–0.9)
ENA SS-A AB SER-ACNC: <0.2 AI (ref 0–0.9)
ENA SS-B AB SER-ACNC: <0.2 AI (ref 0–0.9)
EOSINOPHIL # BLD AUTO: 0.1 X10E3/UL (ref 0–0.4)
EOSINOPHIL NFR BLD AUTO: 2 %
ERYTHROCYTE [DISTWIDTH] IN BLOOD BY AUTOMATED COUNT: 13.1 % (ref 11.7–15.4)
FOLATE SERPL-MCNC: 10.8 NG/ML
GLOBULIN SER CALC-MCNC: 3.1 G/DL (ref 1.5–4.5)
GLUCOSE SERPL-MCNC: 85 MG/DL (ref 70–99)
HBA1C MFR BLD: 4.9 % (ref 4.8–5.6)
HCT VFR BLD AUTO: 43.1 % (ref 34–46.6)
HDLC SERPL-MCNC: 31 MG/DL
HGB BLD-MCNC: 13.4 G/DL (ref 11.1–15.9)
IMM GRANULOCYTES # BLD AUTO: 0 X10E3/UL (ref 0–0.1)
IMM GRANULOCYTES NFR BLD AUTO: 0 %
INSULIN AB SER-ACNC: <5 UU/ML
IRON SATN MFR SERPL: 17 % (ref 15–55)
IRON SERPL-MCNC: 53 UG/DL (ref 27–159)
LDLC SERPL CALC-MCNC: 98 MG/DL (ref 0–99)
LYMPHOCYTES # BLD AUTO: 1.8 X10E3/UL (ref 0.7–3.1)
LYMPHOCYTES NFR BLD AUTO: 27 %
MCH RBC QN AUTO: 25.7 PG (ref 26.6–33)
MCHC RBC AUTO-ENTMCNC: 31.1 G/DL (ref 31.5–35.7)
MCV RBC AUTO: 83 FL (ref 79–97)
MONOCYTES # BLD AUTO: 0.4 X10E3/UL (ref 0.1–0.9)
MONOCYTES NFR BLD AUTO: 6 %
NEUTROPHILS # BLD AUTO: 4.4 X10E3/UL (ref 1.4–7)
NEUTROPHILS NFR BLD AUTO: 65 %
PLATELET # BLD AUTO: 396 X10E3/UL (ref 150–450)
POTASSIUM SERPL-SCNC: 4 MMOL/L (ref 3.5–5.2)
PROT SERPL-MCNC: 7.1 G/DL (ref 6–8.5)
RBC # BLD AUTO: 5.21 X10E6/UL (ref 3.77–5.28)
SODIUM SERPL-SCNC: 138 MMOL/L (ref 134–144)
T3FREE SERPL-MCNC: 3.6 PG/ML (ref 2–4.4)
T3REVERSE SERPL-MCNC: 22 NG/DL (ref 9.2–24.1)
T4 FREE SERPL-MCNC: 1.07 NG/DL (ref 0.82–1.77)
THYROGLOB AB SERPL-ACNC: <1 IU/ML (ref 0–0.9)
THYROPEROXIDASE AB SERPL-ACNC: 11 IU/ML (ref 0–34)
TIBC SERPL-MCNC: 304 UG/DL (ref 250–450)
TRIGL SERPL-MCNC: 146 MG/DL (ref 0–149)
TSH SERPL DL<=0.005 MIU/L-ACNC: 2.29 UIU/ML (ref 0.45–4.5)
UIBC SERPL-MCNC: 251 UG/DL (ref 131–425)
VIT B12 SERPL-MCNC: 448 PG/ML (ref 232–1245)
VLDLC SERPL CALC-MCNC: 26 MG/DL (ref 5–40)
WBC # BLD AUTO: 6.7 X10E3/UL (ref 3.4–10.8)

## 2025-07-28 DIAGNOSIS — F41.1 GENERALIZED ANXIETY DISORDER: ICD-10-CM

## 2025-07-29 RX ORDER — VENLAFAXINE HYDROCHLORIDE 75 MG/1
75 TABLET, EXTENDED RELEASE ORAL
Qty: 30 TABLET | Refills: 3 | Status: SHIPPED | OUTPATIENT
Start: 2025-07-29

## 2025-08-01 ENCOUNTER — TELEPHONE (OUTPATIENT)
Dept: FAMILY MEDICINE CLINIC | Facility: CLINIC | Age: 20
End: 2025-08-01
Payer: COMMERCIAL

## 2025-08-07 PROBLEM — N92.6 IRREGULAR MENSES: Status: RESOLVED | Noted: 2021-11-08 | Resolved: 2025-08-07

## 2025-08-07 PROBLEM — N89.8 VAGINAL DISCHARGE: Status: RESOLVED | Noted: 2021-11-08 | Resolved: 2025-08-07

## 2025-08-07 PROBLEM — N93.8 DUB (DYSFUNCTIONAL UTERINE BLEEDING): Status: RESOLVED | Noted: 2020-03-04 | Resolved: 2025-08-07

## 2025-08-15 ENCOUNTER — OFFICE VISIT (OUTPATIENT)
Dept: ORTHOPEDIC SURGERY | Facility: CLINIC | Age: 20
End: 2025-08-15
Payer: COMMERCIAL

## 2025-08-15 ENCOUNTER — LAB (OUTPATIENT)
Dept: LAB | Facility: HOSPITAL | Age: 20
End: 2025-08-15
Payer: COMMERCIAL

## 2025-08-15 VITALS
DIASTOLIC BLOOD PRESSURE: 74 MMHG | HEART RATE: 78 BPM | SYSTOLIC BLOOD PRESSURE: 108 MMHG | BODY MASS INDEX: 40.32 KG/M2 | WEIGHT: 200 LBS | HEIGHT: 59 IN

## 2025-08-15 DIAGNOSIS — M25.50 ARTHRALGIA OF MULTIPLE JOINTS: ICD-10-CM

## 2025-08-15 DIAGNOSIS — M25.512 LEFT SHOULDER PAIN, UNSPECIFIED CHRONICITY: ICD-10-CM

## 2025-08-15 DIAGNOSIS — M25.50 ARTHRALGIA OF MULTIPLE JOINTS: Primary | ICD-10-CM

## 2025-08-15 LAB
CHROMATIN AB SERPL-ACNC: <10 IU/ML (ref 0–14)
CRP SERPL-MCNC: 2.54 MG/DL (ref 0–0.5)
ERYTHROCYTE [SEDIMENTATION RATE] IN BLOOD: 38 MM/HR (ref 0–20)

## 2025-08-15 PROCEDURE — 86140 C-REACTIVE PROTEIN: CPT

## 2025-08-15 PROCEDURE — 36415 COLL VENOUS BLD VENIPUNCTURE: CPT

## 2025-08-15 PROCEDURE — 85652 RBC SED RATE AUTOMATED: CPT

## 2025-08-15 PROCEDURE — 86431 RHEUMATOID FACTOR QUANT: CPT

## 2025-08-15 PROCEDURE — 86038 ANTINUCLEAR ANTIBODIES: CPT

## 2025-08-15 RX ORDER — CYCLOBENZAPRINE HCL 5 MG
5 TABLET ORAL NIGHTLY PRN
Qty: 30 TABLET | Refills: 0 | Status: SHIPPED | OUTPATIENT
Start: 2025-08-15

## 2025-08-15 RX ORDER — METHYLPREDNISOLONE 4 MG/1
TABLET ORAL
Qty: 21 TABLET | Refills: 0 | Status: SHIPPED | OUTPATIENT
Start: 2025-08-15

## 2025-08-18 ENCOUNTER — PATIENT ROUNDING (BHMG ONLY) (OUTPATIENT)
Dept: ORTHOPEDIC SURGERY | Facility: CLINIC | Age: 20
End: 2025-08-18
Payer: COMMERCIAL

## 2025-08-18 LAB — ANA SER QL: NEGATIVE
